# Patient Record
Sex: MALE | Race: WHITE | Employment: PART TIME
[De-identification: names, ages, dates, MRNs, and addresses within clinical notes are randomized per-mention and may not be internally consistent; named-entity substitution may affect disease eponyms.]

---

## 2019-11-08 ENCOUNTER — HEALTH MAINTENANCE LETTER (OUTPATIENT)
Age: 69
End: 2019-11-08

## 2019-11-24 ENCOUNTER — HOSPITAL ENCOUNTER (EMERGENCY)
Facility: CLINIC | Age: 69
Discharge: HOME | End: 2019-11-24
Attending: EMERGENCY MEDICINE | Admitting: EMERGENCY MEDICINE
Payer: COMMERCIAL

## 2019-11-24 VITALS
DIASTOLIC BLOOD PRESSURE: 97 MMHG | HEART RATE: 72 BPM | TEMPERATURE: 99 F | OXYGEN SATURATION: 96 % | RESPIRATION RATE: 20 BRPM | SYSTOLIC BLOOD PRESSURE: 144 MMHG

## 2019-11-24 DIAGNOSIS — R13.10 ODYNOPHAGIA: ICD-10-CM

## 2019-11-24 LAB
ANION GAP SERPL CALCULATED.3IONS-SCNC: 3 MMOL/L (ref 3–14)
BASOPHILS # BLD AUTO: 0 10E9/L (ref 0–0.2)
BASOPHILS NFR BLD AUTO: 0.3 %
BUN SERPL-MCNC: 20 MG/DL (ref 7–30)
CALCIUM SERPL-MCNC: 9.3 MG/DL (ref 8.5–10.1)
CHLORIDE SERPL-SCNC: 104 MMOL/L (ref 94–109)
CO2 SERPL-SCNC: 31 MMOL/L (ref 20–32)
CREAT SERPL-MCNC: 1.36 MG/DL (ref 0.66–1.25)
DIFFERENTIAL METHOD BLD: ABNORMAL
EOSINOPHIL # BLD AUTO: 0.2 10E9/L (ref 0–0.7)
EOSINOPHIL NFR BLD AUTO: 1.5 %
ERYTHROCYTE [DISTWIDTH] IN BLOOD BY AUTOMATED COUNT: 12.8 % (ref 10–15)
GFR SERPL CREATININE-BSD FRML MDRD: 53 ML/MIN/{1.73_M2}
GLUCOSE SERPL-MCNC: 87 MG/DL (ref 70–99)
HCT VFR BLD AUTO: 47 % (ref 40–53)
HGB BLD-MCNC: 16.1 G/DL (ref 13.3–17.7)
IMM GRANULOCYTES # BLD: 0 10E9/L (ref 0–0.4)
IMM GRANULOCYTES NFR BLD: 0.3 %
LYMPHOCYTES # BLD AUTO: 2.3 10E9/L (ref 0.8–5.3)
LYMPHOCYTES NFR BLD AUTO: 20.1 %
MCH RBC QN AUTO: 33.1 PG (ref 26.5–33)
MCHC RBC AUTO-ENTMCNC: 34.3 G/DL (ref 31.5–36.5)
MCV RBC AUTO: 97 FL (ref 78–100)
MONOCYTES # BLD AUTO: 1.4 10E9/L (ref 0–1.3)
MONOCYTES NFR BLD AUTO: 12.2 %
NEUTROPHILS # BLD AUTO: 7.4 10E9/L (ref 1.6–8.3)
NEUTROPHILS NFR BLD AUTO: 65.6 %
NRBC # BLD AUTO: 0 10*3/UL
NRBC BLD AUTO-RTO: 0 /100
PLATELET # BLD AUTO: 292 10E9/L (ref 150–450)
POTASSIUM SERPL-SCNC: 3.8 MMOL/L (ref 3.4–5.3)
RBC # BLD AUTO: 4.87 10E12/L (ref 4.4–5.9)
SODIUM SERPL-SCNC: 138 MMOL/L (ref 133–144)
WBC # BLD AUTO: 11.3 10E9/L (ref 4–11)

## 2019-11-24 PROCEDURE — 80048 BASIC METABOLIC PNL TOTAL CA: CPT | Performed by: EMERGENCY MEDICINE

## 2019-11-24 PROCEDURE — 99283 EMERGENCY DEPT VISIT LOW MDM: CPT

## 2019-11-24 PROCEDURE — 85025 COMPLETE CBC W/AUTO DIFF WBC: CPT | Performed by: EMERGENCY MEDICINE

## 2019-11-24 RX ORDER — SUCRALFATE ORAL 1 G/10ML
1 SUSPENSION ORAL 4 TIMES DAILY
Qty: 420 ML | Refills: 0 | Status: SHIPPED | OUTPATIENT
Start: 2019-11-24

## 2019-11-24 RX ORDER — LIDOCAINE HYDROCHLORIDE 20 MG/ML
5 SOLUTION OROPHARYNGEAL
Qty: 100 ML | Refills: 0 | Status: SHIPPED | OUTPATIENT
Start: 2019-11-24

## 2019-11-24 ASSESSMENT — ENCOUNTER SYMPTOMS
CHILLS: 1
FEVER: 1
SHORTNESS OF BREATH: 0
TROUBLE SWALLOWING: 1

## 2019-11-24 NOTE — ED AVS SNAPSHOT
Emergency Department  [address] [place] [id number]  Phone:  [phone]  Fax:  [phone]                                    Ryley Springer   MRN: [id number]    Department:   Emergency Department   Date of Visit:  11/24/2019           After Visit Summary Signature Page    I have received my discharge instructions, and my questions have been answered. I have discussed any challenges I see with this plan with the nurse or doctor.    ..........................................................................................................................................  Patient/Patient Representative Signature      ..........................................................................................................................................  Patient Representative Print Name and Relationship to Patient    ..................................................               ................................................  Date                                   Time    ..........................................................................................................................................  Reviewed by Signature/Title    ...................................................              ..............................................  Date                                               Time          22EPIC Rev 08/18

## 2019-11-24 NOTE — ED TRIAGE NOTES
Pt reports for the last 6 days he has been unable to swallow. Pt states he is having difficulty swallowing due to pain in his throat. Pt was seen in an UC and had negative strep and started on prednisone. Pt states he can swallow some of his secretions today

## 2019-11-25 NOTE — ED PROVIDER NOTES
History     Chief Complaint:  Dysphagia    HPI   Ryley Springer is a 69 year old male with history of HIV who presents with dysphagia. The patient states the last 6 days he has had an inflamed throat that has caused dysphagia. He says 3 days prior he visited  and was found negative for strep and given prednisone. The patient reports the prednisone has helped slightly, however, he is still unable to swallow his own secretions. During evaluation, the patient denies shortness of breath, but does admit to chills and feverish symptom (99.5). The patient notes he has experienced this once years prior. He denies history of yeast infections.    Allergies:  No known drug allergies    Medications:    Reyataz  Tenormin  Truvada  Zantac  Norvir    Past Medical History:    HIV positive  Chronic renal insufficiency  HTN    Past Surgical History:    History reviewed. No pertinent surgical history.    Family History:    History reviewed. No pertinent family history.     Social History:  Smoking status: No  Alcohol use: No  Drug use: No  The patient presents to the emergency department by himself.  Marital Status:  Single [1]     Review of Systems   Constitutional: Positive for chills and fever.   HENT: Positive for trouble swallowing.    Respiratory: Negative for shortness of breath.    All other systems reviewed and are negative.    Physical Exam   Patient Vitals for the past 24 hrs:   BP Temp Temp src Pulse Heart Rate Resp SpO2   11/24/19 1855 (!) 144/97 -- -- -- -- -- 96 %   11/24/19 1752 (!) 184/96 99  F (37.2  C) Oral 72 72 20 99 %     Physical Exam  Constitutional: White male sitting.  HENT: No signs of trauma. Oral pharynx is clear, tenderness to the left side of his trachea. No mass, no swelling.  Eyes: EOM are normal. Pupils are equal, round, and reactive to light.   Neck: Normal range of motion. No JVD present. No cervical adenopathy.  Cardiovascular: Regular rhythm.  Exam reveals no gallop and no friction rub.    No  murmur heard.  Pulmonary/Chest: No stridor or respiratory distress  Abdominal: Soft. No tenderness. No rebound or guarding.   Musculoskeletal: No edema. No tenderness.   Lymphadenopathy: No lymphadenopathy.   Neurological: Alert and oriented to person, place, and time. Normal strength. Coordination normal.   Skin: Skin is warm and dry. No rash noted. No erythema.     Emergency Department Course   Laboratory:  CBC: WBC 11.3, HGB 16.1,   BMP: GFR 53, Creatinine 1.36    Procedures:  fiberoptic laryngoscopy  I discussed with patient that doing a fiberoptic bronchoscopy would be best to rule out any obstructive problems in the throat or anything that would look like yeast. I used some Hurricane spray, both in the nose and in the throat. I was able to go through the right nares. There was a lot of saliva. When I asked the patient to swallow he became very anxious and pulled the scope out. We then rested for awhile. I talked about going through the mouth. I obtained a bite block and put that between his teeth. Was able to go in through the mouth and look at the larynx. It was a brief look, however, because again, he became anxious and pulled the scope up. I did not see any mass obstruction nor did I see any white discharge suggestive of yeast. At this point, patient is intolerant of any further attempts.    Emergency Department Course:  Past medical records, nursing notes, and vitals reviewed.  1805: I performed an exam of the patient and obtained history, as documented above.    IV inserted and blood drawn.    1831: Procedure performed as noted above.    1842: I rechecked the patient. Findings and plan explained to the Patient. Patient discharged home with instructions regarding supportive care, medications, and reasons to return. The importance of close follow-up was reviewed.     Impression & Plan    Medical Decision Making:  Ryley Springer is a 69 year old male who is here for painful swallowing. He states this has  been going on for 5-6 days. He did go to urgent care, had a rapid strep done, which was negative, and was given prednisone. He took that for a day or two and felt anxious and made his heart go fast. He can swallow some of his saliva but sometimes spits it in a rag. He states Coca-Cola he is able to drink. He denies fevers or chills, chest pain, or shortness of breath. He is HIV positive with good counts. On exam, his oral pharynx is completely clear. He has some left paratracheal tenderness. There was no neck swelling or adenopathy. Lung sounds are good. Basic blood work was negative. Complete test reveals a minimally elevated white count and a slightly increased creatinine. I will treat him with both viscous lidocaine, spit and swallow, and also Carafate. I already referred him to Dr. Ramirez for upper GI endoscopy. Patient is making urine, he does not appear to be septic or toxic at this point.      Diagnosis:    ICD-10-CM   1. Odynophagia R13.10     Disposition:  discharged to home with follow-up instructions.    Discharge Medications:  START taking these medications    Details   lidocaine (XYLOCAINE) 2 % solution Swish and swallow 5 mLs in mouth every 3 hours as needed for moderate pain ; Max 8 doses/24 hour period., Disp-100 mL, R-0, Local Print   sucralfate (CARAFATE) 1 GM/10ML suspension Take 10 mLs (1 g) by mouth 4 times daily, Disp-420 mL, R-0, Local Print           Scribe Disclosure:  I, Radha Espino, am serving as a scribe at 6:05 PM on 11/24/2019 to document services personally performed by Ac Parham MD based on my observations and the provider's statements to me.    [facility] EMERGENCY DEPARTMENT         Ac Parham MD  11/24/19 0555

## 2020-02-23 ENCOUNTER — HEALTH MAINTENANCE LETTER (OUTPATIENT)
Age: 70
End: 2020-02-23

## 2020-12-06 ENCOUNTER — HEALTH MAINTENANCE LETTER (OUTPATIENT)
Age: 70
End: 2020-12-06

## 2021-04-11 ENCOUNTER — HEALTH MAINTENANCE LETTER (OUTPATIENT)
Age: 71
End: 2021-04-11

## 2021-09-25 ENCOUNTER — HEALTH MAINTENANCE LETTER (OUTPATIENT)
Age: 71
End: 2021-09-25

## 2022-05-07 ENCOUNTER — HEALTH MAINTENANCE LETTER (OUTPATIENT)
Age: 72
End: 2022-05-07

## 2023-04-22 ENCOUNTER — HEALTH MAINTENANCE LETTER (OUTPATIENT)
Age: 73
End: 2023-04-22

## 2023-06-02 ENCOUNTER — HEALTH MAINTENANCE LETTER (OUTPATIENT)
Age: 73
End: 2023-06-02